# Patient Record
Sex: MALE | Race: WHITE | NOT HISPANIC OR LATINO | Employment: FULL TIME | URBAN - METROPOLITAN AREA
[De-identification: names, ages, dates, MRNs, and addresses within clinical notes are randomized per-mention and may not be internally consistent; named-entity substitution may affect disease eponyms.]

---

## 2017-11-17 ENCOUNTER — HISTORICAL (OUTPATIENT)
Dept: ANESTHESIOLOGY | Facility: HOSPITAL | Age: 37
End: 2017-11-17

## 2022-04-11 ENCOUNTER — HISTORICAL (OUTPATIENT)
Dept: ADMINISTRATIVE | Facility: HOSPITAL | Age: 42
End: 2022-04-11

## 2022-04-29 VITALS
WEIGHT: 188 LBS | OXYGEN SATURATION: 98 % | DIASTOLIC BLOOD PRESSURE: 80 MMHG | SYSTOLIC BLOOD PRESSURE: 125 MMHG | HEIGHT: 70 IN | BODY MASS INDEX: 26.92 KG/M2

## 2023-01-26 ENCOUNTER — OFFICE VISIT (OUTPATIENT)
Dept: ORTHOPEDICS | Facility: CLINIC | Age: 43
End: 2023-01-26
Payer: COMMERCIAL

## 2023-01-26 ENCOUNTER — HOSPITAL ENCOUNTER (OUTPATIENT)
Dept: RADIOLOGY | Facility: CLINIC | Age: 43
Discharge: HOME | End: 2023-01-26
Attending: ORTHOPAEDIC SURGERY
Payer: COMMERCIAL

## 2023-01-26 VITALS
DIASTOLIC BLOOD PRESSURE: 82 MMHG | SYSTOLIC BLOOD PRESSURE: 132 MMHG | HEART RATE: 74 BPM | WEIGHT: 198 LBS | HEIGHT: 70 IN | BODY MASS INDEX: 28.35 KG/M2

## 2023-01-26 DIAGNOSIS — M25.511 RIGHT SHOULDER PAIN, UNSPECIFIED CHRONICITY: ICD-10-CM

## 2023-01-26 DIAGNOSIS — M75.101 ROTATOR CUFF SYNDROME OF RIGHT SHOULDER: ICD-10-CM

## 2023-01-26 DIAGNOSIS — M75.21 BICIPITAL TENDINITIS OF RIGHT SHOULDER: Primary | ICD-10-CM

## 2023-01-26 PROCEDURE — 20610 DRAIN/INJ JOINT/BURSA W/O US: CPT | Mod: RT,,, | Performed by: ORTHOPAEDIC SURGERY

## 2023-01-26 PROCEDURE — 1160F PR REVIEW ALL MEDS BY PRESCRIBER/CLIN PHARMACIST DOCUMENTED: ICD-10-PCS | Mod: CPTII,,, | Performed by: ORTHOPAEDIC SURGERY

## 2023-01-26 PROCEDURE — 99204 PR OFFICE/OUTPT VISIT, NEW, LEVL IV, 45-59 MIN: ICD-10-PCS | Mod: 25,,, | Performed by: ORTHOPAEDIC SURGERY

## 2023-01-26 PROCEDURE — 99204 OFFICE O/P NEW MOD 45 MIN: CPT | Mod: 25,,, | Performed by: ORTHOPAEDIC SURGERY

## 2023-01-26 PROCEDURE — 3075F PR MOST RECENT SYSTOLIC BLOOD PRESS GE 130-139MM HG: ICD-10-PCS | Mod: CPTII,,, | Performed by: ORTHOPAEDIC SURGERY

## 2023-01-26 PROCEDURE — 20610 LARGE JOINT ASPIRATION/INJECTION: R SUBACROMIAL BURSA: ICD-10-PCS | Mod: RT,,, | Performed by: ORTHOPAEDIC SURGERY

## 2023-01-26 PROCEDURE — 3075F SYST BP GE 130 - 139MM HG: CPT | Mod: CPTII,,, | Performed by: ORTHOPAEDIC SURGERY

## 2023-01-26 PROCEDURE — 73030 X-RAY EXAM OF SHOULDER: CPT | Mod: RT,,, | Performed by: ORTHOPAEDIC SURGERY

## 2023-01-26 PROCEDURE — 1159F MED LIST DOCD IN RCRD: CPT | Mod: CPTII,,, | Performed by: ORTHOPAEDIC SURGERY

## 2023-01-26 PROCEDURE — 1160F RVW MEDS BY RX/DR IN RCRD: CPT | Mod: CPTII,,, | Performed by: ORTHOPAEDIC SURGERY

## 2023-01-26 PROCEDURE — 73030 XR SHOULDER COMPLETE 2 OR MORE VIEWS RIGHT: ICD-10-PCS | Mod: RT,,, | Performed by: ORTHOPAEDIC SURGERY

## 2023-01-26 PROCEDURE — 1159F PR MEDICATION LIST DOCUMENTED IN MEDICAL RECORD: ICD-10-PCS | Mod: CPTII,,, | Performed by: ORTHOPAEDIC SURGERY

## 2023-01-26 PROCEDURE — 3008F BODY MASS INDEX DOCD: CPT | Mod: CPTII,,, | Performed by: ORTHOPAEDIC SURGERY

## 2023-01-26 PROCEDURE — 3079F PR MOST RECENT DIASTOLIC BLOOD PRESSURE 80-89 MM HG: ICD-10-PCS | Mod: CPTII,,, | Performed by: ORTHOPAEDIC SURGERY

## 2023-01-26 PROCEDURE — 3008F PR BODY MASS INDEX (BMI) DOCUMENTED: ICD-10-PCS | Mod: CPTII,,, | Performed by: ORTHOPAEDIC SURGERY

## 2023-01-26 PROCEDURE — 3079F DIAST BP 80-89 MM HG: CPT | Mod: CPTII,,, | Performed by: ORTHOPAEDIC SURGERY

## 2023-01-26 RX ORDER — LIDOCAINE HYDROCHLORIDE 10 MG/ML
1 INJECTION INFILTRATION; PERINEURAL
Status: DISCONTINUED | OUTPATIENT
Start: 2023-01-26 | End: 2023-01-26 | Stop reason: HOSPADM

## 2023-01-26 RX ORDER — FINASTERIDE 1 MG/1
1 TABLET, FILM COATED ORAL DAILY
COMMUNITY

## 2023-01-26 RX ORDER — BETAMETHASONE SODIUM PHOSPHATE AND BETAMETHASONE ACETATE 3; 3 MG/ML; MG/ML
6 INJECTION, SUSPENSION INTRA-ARTICULAR; INTRALESIONAL; INTRAMUSCULAR; SOFT TISSUE
Status: DISCONTINUED | OUTPATIENT
Start: 2023-01-26 | End: 2023-01-26 | Stop reason: HOSPADM

## 2023-01-26 RX ADMIN — LIDOCAINE HYDROCHLORIDE 1 ML: 10 INJECTION INFILTRATION; PERINEURAL at 08:01

## 2023-01-26 RX ADMIN — BETAMETHASONE SODIUM PHOSPHATE AND BETAMETHASONE ACETATE 6 MG: 3; 3 INJECTION, SUSPENSION INTRA-ARTICULAR; INTRALESIONAL; INTRAMUSCULAR; SOFT TISSUE at 08:01

## 2023-01-26 NOTE — PROCEDURES
Large Joint Aspiration/Injection: R subacromial bursa    Date/Time: 1/26/2023 8:00 AM  Performed by: Rafael Valentin MD  Authorized by: Rafael Valentin MD     Consent Done?:  Yes (Verbal)  Indications:  Pain  Site marked: the procedure site was marked    Timeout: prior to procedure the correct patient, procedure, and site was verified    Prep: patient was prepped and draped in usual sterile fashion    Approach:  Posterior  Location:  Shoulder  Site:  R subacromial bursa  Medications:  1 mL LIDOcaine HCL 10 mg/ml (1%) 10 mg/mL (1 %); 6 mg betamethasone acetate-betamethasone sodium phosphate 6 mg/mL  Patient tolerance:  Patient tolerated the procedure well with no immediate complications

## 2023-01-26 NOTE — PROGRESS NOTES
[facility]  Orthopedic Clinic Note      Chief Complaint:   Chief Complaint   Patient presents with    Right Shoulder - Pain     Rt shoulder pain no injury or sx. Over the summer was throwing football and felt pain. Woke up sore but continued lifting weights until nov. Pain gets worse every now and then. Hurts with throwing.     Referring Physician: No ref. provider found      History of Present Illness:    This is a 42 y.o. year old male presenting with complaints of right shoulder pain since summer 2022.  He reports that sometime last summer he was throwing a football when he felt an acute pain in his shoulder.  He rested after that event and woke up the next day sore, but with no significant symptoms.  He returned to his normal activities and continued weight lifting until November 2022.  He states that since November, his symptoms have progressively worsened.  He has made major modifications to his exercise routine and has had some improvement, but continues to notice the pain.  This shoulder pain is moderate to severe.  Patient reports increased pain over the anterolateral and anterior aspect of the shoulder and biceps, particularly when throwing a football or baseball.  Patient has also been taking over the counter Tylenol and anti-inflammatories with minimal improvement.        History reviewed. No pertinent past medical history.    Past Surgical History:   Procedure Laterality Date    MASTECTOMY FOR GYNECOMASTIA  2013    TONSILLECTOMY         Current Outpatient Medications   Medication Sig    finasteride (PROPECIA) 1 mg tablet Take 1 mg by mouth once daily.     No current facility-administered medications for this visit.       Review of patient's allergies indicates:  No Known Allergies    Family History   Problem Relation Age of Onset    Arthritis Mother     Diabetes Father        Social History     Socioeconomic History    Marital status:    Tobacco Use    Smoking status: Never     "Smokeless tobacco: Never   Substance and Sexual Activity    Alcohol use: Yes     Comment: socially    Drug use: Never           Review of Systems:  All review of systems negative except for those stated in the HPI.    Examination:    Vital Signs:    Vitals:    01/26/23 0755   BP: 132/82   Pulse: 74   Weight: 89.8 kg (198 lb)   Height: 5' 10" (1.778 m)   PainSc:   6       Body mass index is 28.41 kg/m².    Physical Examination:  General: Well-developed, well-nourished.  Neuro: Alert and oriented x 3.  Psych: Normal mood and affect.  Right Shoulder Exam:  No obvious deformity. No medial or lateral scapula winging. Forward flexion to 170 degrees and abduction to 170 degrees and external rotation 80 degrees is and internal rotation is 80 degrees. Negative empty can, Whipple, Drop Arm Test, Rivera, impingement, AC joint tenderness. Positive biceps groove tenderness, Jacques´s, Yergason´s, Speed test. Negative Apprehension and Relocation test. 4/5 strength, normal skin appearance and palpable pulses distally. Sensibility normal.      Imaging: X-rays ordered and images interpreted today personally by me of 4 views of the right shoulder demonstrate no acute osseous pathology.    Assessment: Bicipital tendinitis of right shoulder  -     Tendon Sheath  -     Ambulatory referral/consult to Physical/Occupational Therapy; Future; Expected date: 02/02/2023    Rotator cuff syndrome of right shoulder  -     Ambulatory referral/consult to Physical/Occupational Therapy; Future; Expected date: 02/02/2023  -     Large Joint Aspiration/Injection: R subacromial bursa    Right shoulder pain, unspecified chronicity  -     X-ray Shoulder 2 or More Views Right; Future; Expected date: 01/26/2023    Other orders  -     Cancel: Intermediate Joint Aspiration/Injection        Plan: X-rays were reviewed with the patient.  He has made major modifications to his normal exercise regimen with continued symptoms.  Plan to proceed conservatively with " formal physical therapy and bicipital groove corticosteroid injection.  He can continue to participate in light weight lifting activities as tolerated.  He can continue over the counter medications as needed for pain.  He will return to clinic in 6-8 weeks for reevaluation.  He verbalized understanding of the plan of care with no further questions.    Rafael Valentin MD personally performed the services described in this documentation, including but not limited to patient's history, physical examination, and assessment and plan of care. All medical record entries made by CASTILLO Merritt were performed at his direction and in his presence. The medical record was reviewed and is accurate and complete.    Large Joint Aspiration/Injection: R subacromial bursa    Date/Time: 1/26/2023 8:00 AM  Performed by: Rafael Valentin MD  Authorized by: Rafael Valentin MD     Consent Done?:  Yes (Verbal)  Indications:  Pain  Site marked: the procedure site was marked    Timeout: prior to procedure the correct patient, procedure, and site was verified    Prep: patient was prepped and draped in usual sterile fashion    Approach:  Posterior  Location:  Shoulder  Site:  R subacromial bursa  Medications:  1 mL LIDOcaine HCL 10 mg/ml (1%) 10 mg/mL (1 %); 6 mg betamethasone acetate-betamethasone sodium phosphate 6 mg/mL  Patient tolerance:  Patient tolerated the procedure well with no immediate complications    Follow up in about 7 weeks (around 3/16/2023) for Reevaluation.      DISCLAIMER: This note may have been dictated using voice recognition software and may contain grammatical errors.     NOTE: Consult report sent to referring provider via [employer].

## 2023-03-09 ENCOUNTER — OFFICE VISIT (OUTPATIENT)
Dept: ORTHOPEDICS | Facility: CLINIC | Age: 43
End: 2023-03-09
Payer: COMMERCIAL

## 2023-03-09 VITALS
SYSTOLIC BLOOD PRESSURE: 123 MMHG | DIASTOLIC BLOOD PRESSURE: 81 MMHG | BODY MASS INDEX: 28.35 KG/M2 | WEIGHT: 198 LBS | HEART RATE: 59 BPM | HEIGHT: 70 IN

## 2023-03-09 DIAGNOSIS — M54.16 LEFT LUMBAR RADICULOPATHY: ICD-10-CM

## 2023-03-09 DIAGNOSIS — M75.101 ROTATOR CUFF SYNDROME OF RIGHT SHOULDER: ICD-10-CM

## 2023-03-09 DIAGNOSIS — M25.511 ACUTE PAIN OF RIGHT SHOULDER: ICD-10-CM

## 2023-03-09 DIAGNOSIS — M54.9 DORSALGIA, UNSPECIFIED: ICD-10-CM

## 2023-03-09 DIAGNOSIS — M75.21 BICIPITAL TENDINITIS OF RIGHT SHOULDER: Primary | ICD-10-CM

## 2023-03-09 DIAGNOSIS — M54.16 LUMBAR RADICULOPATHY, CHRONIC: ICD-10-CM

## 2023-03-09 PROCEDURE — 3079F DIAST BP 80-89 MM HG: CPT | Mod: CPTII,,, | Performed by: ORTHOPAEDIC SURGERY

## 2023-03-09 PROCEDURE — 1160F PR REVIEW ALL MEDS BY PRESCRIBER/CLIN PHARMACIST DOCUMENTED: ICD-10-PCS | Mod: CPTII,,, | Performed by: ORTHOPAEDIC SURGERY

## 2023-03-09 PROCEDURE — 99214 PR OFFICE/OUTPT VISIT, EST, LEVL IV, 30-39 MIN: ICD-10-PCS | Mod: ,,, | Performed by: ORTHOPAEDIC SURGERY

## 2023-03-09 PROCEDURE — 1159F MED LIST DOCD IN RCRD: CPT | Mod: CPTII,,, | Performed by: ORTHOPAEDIC SURGERY

## 2023-03-09 PROCEDURE — 3074F PR MOST RECENT SYSTOLIC BLOOD PRESSURE < 130 MM HG: ICD-10-PCS | Mod: CPTII,,, | Performed by: ORTHOPAEDIC SURGERY

## 2023-03-09 PROCEDURE — 3079F PR MOST RECENT DIASTOLIC BLOOD PRESSURE 80-89 MM HG: ICD-10-PCS | Mod: CPTII,,, | Performed by: ORTHOPAEDIC SURGERY

## 2023-03-09 PROCEDURE — 1160F RVW MEDS BY RX/DR IN RCRD: CPT | Mod: CPTII,,, | Performed by: ORTHOPAEDIC SURGERY

## 2023-03-09 PROCEDURE — 3074F SYST BP LT 130 MM HG: CPT | Mod: CPTII,,, | Performed by: ORTHOPAEDIC SURGERY

## 2023-03-09 PROCEDURE — 1159F PR MEDICATION LIST DOCUMENTED IN MEDICAL RECORD: ICD-10-PCS | Mod: CPTII,,, | Performed by: ORTHOPAEDIC SURGERY

## 2023-03-09 PROCEDURE — 3008F BODY MASS INDEX DOCD: CPT | Mod: CPTII,,, | Performed by: ORTHOPAEDIC SURGERY

## 2023-03-09 PROCEDURE — 99214 OFFICE O/P EST MOD 30 MIN: CPT | Mod: ,,, | Performed by: ORTHOPAEDIC SURGERY

## 2023-03-09 PROCEDURE — 3008F PR BODY MASS INDEX (BMI) DOCUMENTED: ICD-10-PCS | Mod: CPTII,,, | Performed by: ORTHOPAEDIC SURGERY

## 2023-03-09 NOTE — PROGRESS NOTES
[facility]  Orthopedic Clinic Note      Chief Complaint:   Chief Complaint   Patient presents with    Right Shoulder - Follow-up    Follow-up     f/u for right shoulder injection on 1/26/23, stated it did help and so did PT but still does have some pain with certain activites, the pain in the back of the shoulder has gone completely     Referring Physician: No ref. provider found      History of Present Illness:    This is a 43 y.o. year old male presenting with complaints of right shoulder pain since summer 2022.  He reports that sometime last summer he was throwing a football when he felt an acute pain in his shoulder.  He rested after that event and woke up the next day sore, but with no significant symptoms.  He returned to his normal activities and continued weight lifting until November 2022.  He states that since November, his symptoms have progressively worsened.  He has made major modifications to his exercise routine and has had some improvement, but continues to notice the pain.  This shoulder pain is moderate to severe.  Patient reports increased pain over the anterolateral and anterior aspect of the shoulder and biceps, particularly when throwing a football or baseball.  Patient has also been taking over the counter Tylenol and anti-inflammatories with minimal improvement.  03/09/2023 Patient presents 6 weeks following a right shoulder injection. Stated the injection and physical therapy did help but still does ave some pain with certain movements or throwing the baseball with son.  He also complains of lumbar back pain he has been dealing with chronically for quite some time.  He states that he is has a radiating pain down the left lower extremity that goes all the way to the anterior aspect of the shin bone.        History reviewed. No pertinent past medical history.    Past Surgical History:   Procedure Laterality Date    MASTECTOMY FOR GYNECOMASTIA  2013    TONSILLECTOMY         Current  "Outpatient Medications   Medication Sig    finasteride (PROPECIA) 1 mg tablet Take 1 mg by mouth once daily.     No current facility-administered medications for this visit.       Review of patient's allergies indicates:  No Known Allergies    Family History   Problem Relation Age of Onset    Arthritis Mother     Diabetes Father        Social History     Socioeconomic History    Marital status:    Tobacco Use    Smoking status: Never    Smokeless tobacco: Never   Substance and Sexual Activity    Alcohol use: Yes     Comment: socially    Drug use: Never    Sexual activity: Yes           Review of Systems:  All review of systems negative except for those stated in the HPI.    Examination:    Vital Signs:    Vitals:    03/09/23 0845 03/09/23 0846   BP: 123/81    Pulse: (!) 59    Weight: 89.8 kg (198 lb)    Height: 5' 10" (1.778 m)    PainSc:    1       Body mass index is 28.41 kg/m².    Physical Examination:  General: Well-developed, well-nourished.  Neuro: Alert and oriented x 3.  Psych: Normal mood and affect.    Right Shoulder Exam:  No obvious deformity. No medial or lateral scapula winging. Forward flexion to 170 degrees and abduction to 170 degrees and external rotation 80 degrees is and internal rotation is 80 degrees. Negative empty can, Whipple, Drop Arm Test, Rivera, impingement, AC joint tenderness. Positive biceps groove tenderness, Jacques´s, Yergason´s, Speed test. Negative Apprehension and Relocation test. 4/5 strength, normal skin appearance and palpable pulses distally. Sensibility normal.    Lumbar Exam:  No deformity. Negative tenderness to palpation along the spine. No step off. 5/5 strength right lower extremity. 4/5 strength left lower extremity. Positive lower tract signs. hypo-reflexes. Negative clonus. Normal skin appearance. Sensibility normal.      Assessment: Bicipital tendinitis of right shoulder  -     MRI Shoulder Without Contrast Right; Future; Expected date: 03/09/2023    Rotator " cuff syndrome of right shoulder  -     MRI Shoulder Without Contrast Right; Future; Expected date: 03/09/2023    Left lumbar radiculopathy  -     Cancel: MRI Lumbar Spine Without Contrast; Future; Expected date: 03/16/2023  -     MRI Lumbar Spine Without Contrast; Future; Expected date: 03/16/2023    Acute pain of right shoulder  -     MRI Shoulder Without Contrast Right; Future; Expected date: 03/09/2023    Dorsalgia, unspecified  -     Cancel: MRI Lumbar Spine Without Contrast; Future; Expected date: 03/16/2023  -     MRI Lumbar Spine Without Contrast; Future; Expected date: 03/16/2023    Lumbar radiculopathy, chronic  -     MRI Lumbar Spine Without Contrast; Future; Expected date: 03/16/2023          Plan:  Since this patient continues to have symptoms despite physical therapy and other forms of conservative treatment, we will proceed with an MRI of his right shoulder.  With these ongoing chronic radicular symptoms in the left lower extremity, I will also obtain a MRI of his lumbar spine.    Rafael Valentin MD personally performed the services described in this documentation, including but not limited to patient's history, physical examination, and assessment and plan of care. All medical record entries made by CASTILLO Merritt were performed at his direction and in his presence. The medical record was reviewed and is accurate and complete.         No follow-ups on file.      DISCLAIMER: This note may have been dictated using voice recognition software and may contain grammatical errors.     NOTE: Consult report sent to referring provider via [employer].

## 2023-03-16 ENCOUNTER — OFFICE VISIT (OUTPATIENT)
Dept: ORTHOPEDICS | Facility: CLINIC | Age: 43
End: 2023-03-16
Payer: COMMERCIAL

## 2023-03-16 VITALS
BODY MASS INDEX: 28.35 KG/M2 | DIASTOLIC BLOOD PRESSURE: 84 MMHG | WEIGHT: 198 LBS | HEART RATE: 69 BPM | HEIGHT: 70 IN | SYSTOLIC BLOOD PRESSURE: 132 MMHG

## 2023-03-16 DIAGNOSIS — M51.36 DEGENERATIVE DISC DISEASE, LUMBAR: ICD-10-CM

## 2023-03-16 DIAGNOSIS — M75.21 BICIPITAL TENDINITIS OF RIGHT SHOULDER: ICD-10-CM

## 2023-03-16 DIAGNOSIS — M67.819 TENDINOSIS OF ROTATOR CUFF: Primary | ICD-10-CM

## 2023-03-16 DIAGNOSIS — M19.011 ARTHROSIS OF RIGHT ACROMIOCLAVICULAR JOINT: ICD-10-CM

## 2023-03-16 PROCEDURE — 1160F RVW MEDS BY RX/DR IN RCRD: CPT | Mod: CPTII,,, | Performed by: ORTHOPAEDIC SURGERY

## 2023-03-16 PROCEDURE — 1159F PR MEDICATION LIST DOCUMENTED IN MEDICAL RECORD: ICD-10-PCS | Mod: CPTII,,, | Performed by: ORTHOPAEDIC SURGERY

## 2023-03-16 PROCEDURE — 99213 OFFICE O/P EST LOW 20 MIN: CPT | Mod: ,,, | Performed by: ORTHOPAEDIC SURGERY

## 2023-03-16 PROCEDURE — 3079F DIAST BP 80-89 MM HG: CPT | Mod: CPTII,,, | Performed by: ORTHOPAEDIC SURGERY

## 2023-03-16 PROCEDURE — 3075F PR MOST RECENT SYSTOLIC BLOOD PRESS GE 130-139MM HG: ICD-10-PCS | Mod: CPTII,,, | Performed by: ORTHOPAEDIC SURGERY

## 2023-03-16 PROCEDURE — 3079F PR MOST RECENT DIASTOLIC BLOOD PRESSURE 80-89 MM HG: ICD-10-PCS | Mod: CPTII,,, | Performed by: ORTHOPAEDIC SURGERY

## 2023-03-16 PROCEDURE — 3008F BODY MASS INDEX DOCD: CPT | Mod: CPTII,,, | Performed by: ORTHOPAEDIC SURGERY

## 2023-03-16 PROCEDURE — 99213 PR OFFICE/OUTPT VISIT, EST, LEVL III, 20-29 MIN: ICD-10-PCS | Mod: ,,, | Performed by: ORTHOPAEDIC SURGERY

## 2023-03-16 PROCEDURE — 3008F PR BODY MASS INDEX (BMI) DOCUMENTED: ICD-10-PCS | Mod: CPTII,,, | Performed by: ORTHOPAEDIC SURGERY

## 2023-03-16 PROCEDURE — 1160F PR REVIEW ALL MEDS BY PRESCRIBER/CLIN PHARMACIST DOCUMENTED: ICD-10-PCS | Mod: CPTII,,, | Performed by: ORTHOPAEDIC SURGERY

## 2023-03-16 PROCEDURE — 3075F SYST BP GE 130 - 139MM HG: CPT | Mod: CPTII,,, | Performed by: ORTHOPAEDIC SURGERY

## 2023-03-16 PROCEDURE — 1159F MED LIST DOCD IN RCRD: CPT | Mod: CPTII,,, | Performed by: ORTHOPAEDIC SURGERY

## 2023-03-16 NOTE — PROGRESS NOTES
[facility]  Orthopedic Clinic Note      Chief Complaint:   Chief Complaint   Patient presents with    Lumbar Spine - Follow-up     Rt shoulder and lumbar spine MRI results. Pt sates some pain today.     Right Shoulder - Follow-up     Referring Physician: No ref. provider found      History of Present Illness:    This is a 43 y.o. year old male presenting with complaints of right shoulder pain since summer 2022.  He reports that sometime last summer he was throwing a football when he felt an acute pain in his shoulder.  He rested after that event and woke up the next day sore, but with no significant symptoms.  He returned to his normal activities and continued weight lifting until November 2022.  He states that since November, his symptoms have progressively worsened.  He has made major modifications to his exercise routine and has had some improvement, but continues to notice the pain.  This shoulder pain is moderate to severe.  Patient reports increased pain over the anterolateral and anterior aspect of the shoulder and biceps, particularly when throwing a football or baseball.  Patient has also been taking over the counter Tylenol and anti-inflammatories with minimal improvement.  03/09/2023 Patient presents 6 weeks following a right shoulder injection. Stated the injection and physical therapy did help but still does ave some pain with certain movements or throwing the baseball with son.  He also complains of lumbar back pain he has been dealing with chronically for quite some time.  He states that he is has a radiating pain down the left lower extremity that goes all the way to the anterior aspect of the shin bone.  03/16/2023 Patient presents for review of right shoulder and lumbar spine MRI results.  MRI of lumbar spine demonstrated mild multilevel lumbar degenerative facet arthrosis and degenerative disc disease at L4-L5 with shallow broad-based central disc protrusion.  Mild spinal canal and  "bilateral lateral recess narrowing at L4-L5 along with mild bilateral foraminal narrowing at this level.  MRI of right shoulder demonstrated moderate acromioclavicular joint arthrosis and moderate rotator cuff tendinosis.  His symptoms are unchanged since his prior visit.        History reviewed. No pertinent past medical history.    Past Surgical History:   Procedure Laterality Date    MASTECTOMY FOR GYNECOMASTIA  2013    TONSILLECTOMY         Current Outpatient Medications   Medication Sig    finasteride (PROPECIA) 1 mg tablet Take 1 mg by mouth once daily.     No current facility-administered medications for this visit.       Review of patient's allergies indicates:  No Known Allergies    Family History   Problem Relation Age of Onset    Arthritis Mother     Diabetes Father        Social History     Socioeconomic History    Marital status:    Tobacco Use    Smoking status: Never    Smokeless tobacco: Never   Substance and Sexual Activity    Alcohol use: Yes     Comment: socially    Drug use: Never    Sexual activity: Yes           Review of Systems:  All review of systems negative except for those stated in the HPI.    Examination:    Vital Signs:    Vitals:    03/16/23 0810   BP: 132/84   Pulse: 69   Weight: 89.8 kg (198 lb)   Height: 5' 10" (1.778 m)       Body mass index is 28.41 kg/m².    Physical Examination:  General: Well-developed, well-nourished.  Neuro: Alert and oriented x 3.  Psych: Normal mood and affect.  Right Shoulder Exam:  No obvious deformity. No medial or lateral scapula winging. Forward flexion to 170 degrees and abduction to 170 degrees and external rotation 80 degrees is and internal rotation is 80 degrees. Negative empty can, Whipple, Drop Arm Test, Rivera, impingement, AC joint tenderness. Positive biceps groove tenderness, Jacques´s, Yergason´s, Speed test. Negative Apprehension and Relocation test. 4/5 strength, normal skin appearance and palpable pulses distally. Sensibility " normal.  Lumbar Exam:  No deformity. Negative tenderness to palpation along the spine. No step off. 5/5 strength right lower extremity. 4/5 strength left lower extremity. Positive lower tract signs. hypo-reflexes. Negative clonus. Normal skin appearance. Sensibility normal.      Assessment: Tendinosis of rotator cuff    Bicipital tendinitis of right shoulder    Arthrosis of right acromioclavicular joint    Degenerative disc disease, lumbar        Plan:  MRI results were reviewed with the patient.  In regards to his right shoulder, he continues to fail exhaustive conservative treatments.  Recommend surgical intervention via right shoulder arthroscopic rotator cuff debridement with platelet rich plasma injection, distal clavicle excision, and possible biceps tenotomy versus tenodesis pending intraoperative evaluation.  He would like to take some time to consider surgical intervention and will contact our office when he is ready to proceed.  Continue over-the-counter medications as needed for pain.  Return to clinic once he has decided on a treatment plan.  He verbalized understanding of the plan of care with no further questions.    In regards to his lumbar spine, plan to refer him to nonoperative spine specialists, Sadia Winter MD, for further evaluation recommendations.  He verbalized understanding of the plan of care with no further questions.    Rafael Valentin MD personally performed the services described in this documentation, including but not limited to patient's history, physical examination, and assessment and plan of care. All medical record entries made by CASTILLO Merritt were performed at his direction and in his presence. The medical record was reviewed and is accurate and complete.         Follow up if symptoms worsen or fail to improve.      DISCLAIMER: This note may have been dictated using voice recognition software and may contain grammatical errors.     NOTE: Consult report sent to referring  provider via [facility] [facility].

## 2023-03-20 ENCOUNTER — OFFICE VISIT (OUTPATIENT)
Dept: PAIN MEDICINE | Facility: CLINIC | Age: 43
End: 2023-03-20
Payer: COMMERCIAL

## 2023-03-20 VITALS
BODY MASS INDEX: 28.35 KG/M2 | DIASTOLIC BLOOD PRESSURE: 82 MMHG | WEIGHT: 198 LBS | HEART RATE: 68 BPM | SYSTOLIC BLOOD PRESSURE: 114 MMHG | TEMPERATURE: 98 F | HEIGHT: 70 IN

## 2023-03-20 DIAGNOSIS — M54.16 LUMBAR RADICULOPATHY: Primary | ICD-10-CM

## 2023-03-20 PROCEDURE — 3074F PR MOST RECENT SYSTOLIC BLOOD PRESSURE < 130 MM HG: ICD-10-PCS | Mod: CPTII,,, | Performed by: NURSE PRACTITIONER

## 2023-03-20 PROCEDURE — 3079F PR MOST RECENT DIASTOLIC BLOOD PRESSURE 80-89 MM HG: ICD-10-PCS | Mod: CPTII,,, | Performed by: NURSE PRACTITIONER

## 2023-03-20 PROCEDURE — 3079F DIAST BP 80-89 MM HG: CPT | Mod: CPTII,,, | Performed by: NURSE PRACTITIONER

## 2023-03-20 PROCEDURE — 1159F MED LIST DOCD IN RCRD: CPT | Mod: CPTII,,, | Performed by: NURSE PRACTITIONER

## 2023-03-20 PROCEDURE — 99204 OFFICE O/P NEW MOD 45 MIN: CPT | Mod: ,,, | Performed by: NURSE PRACTITIONER

## 2023-03-20 PROCEDURE — 1160F PR REVIEW ALL MEDS BY PRESCRIBER/CLIN PHARMACIST DOCUMENTED: ICD-10-PCS | Mod: CPTII,,, | Performed by: NURSE PRACTITIONER

## 2023-03-20 PROCEDURE — 3008F BODY MASS INDEX DOCD: CPT | Mod: CPTII,,, | Performed by: NURSE PRACTITIONER

## 2023-03-20 PROCEDURE — 1159F PR MEDICATION LIST DOCUMENTED IN MEDICAL RECORD: ICD-10-PCS | Mod: CPTII,,, | Performed by: NURSE PRACTITIONER

## 2023-03-20 PROCEDURE — 3008F PR BODY MASS INDEX (BMI) DOCUMENTED: ICD-10-PCS | Mod: CPTII,,, | Performed by: NURSE PRACTITIONER

## 2023-03-20 PROCEDURE — 3074F SYST BP LT 130 MM HG: CPT | Mod: CPTII,,, | Performed by: NURSE PRACTITIONER

## 2023-03-20 PROCEDURE — 1160F RVW MEDS BY RX/DR IN RCRD: CPT | Mod: CPTII,,, | Performed by: NURSE PRACTITIONER

## 2023-03-20 PROCEDURE — 99204 PR OFFICE/OUTPT VISIT, NEW, LEVL IV, 45-59 MIN: ICD-10-PCS | Mod: ,,, | Performed by: NURSE PRACTITIONER
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
